# Patient Record
Sex: FEMALE | Race: WHITE | NOT HISPANIC OR LATINO | Employment: FULL TIME | URBAN - METROPOLITAN AREA
[De-identification: names, ages, dates, MRNs, and addresses within clinical notes are randomized per-mention and may not be internally consistent; named-entity substitution may affect disease eponyms.]

---

## 2017-06-14 ENCOUNTER — OFFICE VISIT (OUTPATIENT)
Dept: URGENT CARE | Facility: CLINIC | Age: 32
End: 2017-06-14
Payer: COMMERCIAL

## 2017-06-14 DIAGNOSIS — R23.8 OTHER SKIN CHANGES: ICD-10-CM

## 2017-06-14 PROCEDURE — 99214 OFFICE O/P EST MOD 30 MIN: CPT

## 2017-06-15 ENCOUNTER — APPOINTMENT (OUTPATIENT)
Dept: LAB | Facility: HOSPITAL | Age: 32
End: 2017-06-15
Attending: FAMILY MEDICINE
Payer: COMMERCIAL

## 2017-06-15 PROCEDURE — 87205 SMEAR GRAM STAIN: CPT

## 2017-06-15 PROCEDURE — 87147 CULTURE TYPE IMMUNOLOGIC: CPT

## 2017-06-15 PROCEDURE — 87070 CULTURE OTHR SPECIMN AEROBIC: CPT

## 2018-01-18 NOTE — MISCELLANEOUS
Message  Return to work or school:   Maggie Barry is under my professional care  She was seen in my office on 10/18/16   She is able to return to work on  In 2 to 3 days              Signatures   Electronically signed by : Queen Jian MD; Oct 18 2016  3:13PM EST                       (Author)

## 2018-02-28 ENCOUNTER — APPOINTMENT (OUTPATIENT)
Dept: RADIOLOGY | Facility: CLINIC | Age: 33
End: 2018-02-28
Payer: COMMERCIAL

## 2018-02-28 DIAGNOSIS — M25.561 RIGHT KNEE PAIN: ICD-10-CM

## 2018-02-28 PROCEDURE — 73564 X-RAY EXAM KNEE 4 OR MORE: CPT

## 2018-07-01 ENCOUNTER — HOSPITAL ENCOUNTER (EMERGENCY)
Facility: HOSPITAL | Age: 33
Discharge: HOME | End: 2018-07-01
Attending: EMERGENCY MEDICINE | Admitting: EMERGENCY MEDICINE
Payer: COMMERCIAL

## 2018-07-01 ENCOUNTER — APPOINTMENT (EMERGENCY)
Dept: RADIOLOGY | Facility: HOSPITAL | Age: 33
End: 2018-07-01
Payer: COMMERCIAL

## 2018-07-01 VITALS
SYSTOLIC BLOOD PRESSURE: 105 MMHG | RESPIRATION RATE: 15 BRPM | DIASTOLIC BLOOD PRESSURE: 64 MMHG | BODY MASS INDEX: 21.46 KG/M2 | HEART RATE: 94 BPM | OXYGEN SATURATION: 98 % | WEIGHT: 125 LBS | TEMPERATURE: 98.4 F

## 2018-07-01 DIAGNOSIS — S60.512A ABRASION OF PALM OF HAND, LEFT, INITIAL ENCOUNTER: Primary | ICD-10-CM

## 2018-07-01 PROCEDURE — 90715 TDAP VACCINE 7 YRS/> IM: CPT | Performed by: EMERGENCY MEDICINE

## 2018-07-01 PROCEDURE — 99283 EMERGENCY DEPT VISIT LOW MDM: CPT

## 2018-07-01 PROCEDURE — 73130 X-RAY EXAM OF HAND: CPT

## 2018-07-01 PROCEDURE — 90471 IMMUNIZATION ADMIN: CPT

## 2018-07-01 RX ORDER — GINSENG 100 MG
1 CAPSULE ORAL ONCE
Status: COMPLETED | OUTPATIENT
Start: 2018-07-01 | End: 2018-07-01

## 2018-07-01 RX ADMIN — TETANUS TOXOID, REDUCED DIPHTHERIA TOXOID AND ACELLULAR PERTUSSIS VACCINE, ADSORBED 0.5 ML: 5; 2.5; 8; 8; 2.5 SUSPENSION INTRAMUSCULAR at 22:49

## 2018-07-01 RX ADMIN — BACITRACIN ZINC 1 SMALL APPLICATION: 500 OINTMENT TOPICAL at 22:47

## 2018-07-02 NOTE — ED PROVIDER NOTES
History  Chief Complaint   Patient presents with    Hand Laceration     Left palm laceration; washing glass and it broke  Patient complains of left hand laceration occurred just prior to arrival washing a glass and it shattered - concerned with glass in laceration  Patient unsure of last tetanus vaccine  None       Past Medical History:   Diagnosis Date    Migraine        Past Surgical History:   Procedure Laterality Date    CHOLECYSTECTOMY         History reviewed  No pertinent family history  I have reviewed and agree with the history as documented  Social History   Substance Use Topics    Smoking status: Current Some Day Smoker     Types: Cigarettes    Smokeless tobacco: Never Used    Alcohol use Yes      Comment: socially        Review of Systems   All other systems reviewed and are negative  Physical Exam  Physical Exam   Constitutional: She appears well-developed and well-nourished  HENT:   Mouth/Throat: Oropharynx is clear and moist    Eyes: Conjunctivae and EOM are normal  Pupils are equal, round, and reactive to light  Neck: Normal range of motion  Neck supple  No spinous process tenderness present  Cardiovascular: Normal rate, regular rhythm, normal heart sounds and intact distal pulses  Pulmonary/Chest: Effort normal and breath sounds normal  No respiratory distress  She has no wheezes  Abdominal: Soft  Bowel sounds are normal  She exhibits no distension  There is no tenderness  Musculoskeletal: Normal range of motion  Left hand: She exhibits tenderness  She exhibits normal range of motion  Normal sensation noted  Normal strength noted  Hands:  Left palm 2 cm abrasion no foreign bodies present   Neurological: She is alert  She has normal strength  No sensory deficit  GCS eye subscore is 4  GCS verbal subscore is 5  GCS motor subscore is 6  Skin: Skin is warm and dry  No rash noted  Psychiatric: She has a normal mood and affect     Nursing note and vitals reviewed  Vital Signs  ED Triage Vitals [07/01/18 2110]   Temperature Pulse Respirations Blood Pressure SpO2   98 4 °F (36 9 °C) 94 15 105/64 98 %      Temp Source Heart Rate Source Patient Position - Orthostatic VS BP Location FiO2 (%)   Tympanic Monitor -- -- --      Pain Score       8           Vitals:    07/01/18 2110   BP: 105/64   Pulse: 94       Visual Acuity      ED Medications  Medications   tetanus-diphtheria-acellular pertussis (BOOSTRIX) IM injection 0 5 mL (0 5 mL Intramuscular Given 7/1/18 2249)   bacitracin topical ointment 1 small application (1 small application Topical Given 7/1/18 2247)       Diagnostic Studies  Results Reviewed     None                 XR hand 3+ views LEFT   ED Interpretation by Rocio Villanueva DO (07/01 2238)   No acute abnl                 Procedures  Procedures       Phone Contacts  ED Phone Contact    ED Course                               MDM  Number of Diagnoses or Management Options  Abrasion of palm of hand, left, initial encounter: new and requires workup     Amount and/or Complexity of Data Reviewed  Tests in the radiology section of CPT®: ordered and reviewed    Patient Progress  Patient progress: improved    CritCare Time    Disposition  Final diagnoses:   Abrasion of palm of hand, left, initial encounter     Time reflects when diagnosis was documented in both MDM as applicable and the Disposition within this note     Time User Action Codes Description Comment    7/1/2018 10:38 PM Thalia Cardenas Add [[id number] [id number]] Abrasion of palm of hand, left, initial encounter       ED Disposition     ED Disposition Condition Comment    Discharge  Priscella Moots discharge to home/self care  Condition at discharge: Good        Follow-up Information    None         There are no discharge medications for this patient  No discharge procedures on file      ED Provider  Electronically Signed by           Rocio Villanueva DO  07/02/18 8797

## 2018-07-02 NOTE — DISCHARGE INSTRUCTIONS
Abrasion   WHAT YOU NEED TO KNOW:   What is an abrasion? An abrasion is a scrape on your skin  It happens when your skin rubs against a rough surface  Some examples of an abrasion include rug burn, a skinned elbow, or road rash  Abrasions can be many shapes and sizes  The wound may hurt, bleed, bruise, or swell  How can I care for my abrasion? · Wash your hands and dry them with a clean towel  · Press a clean cloth against your wound to stop any bleeding  · Rinse your wound with a lot of clean water  Do not use harsh soap, alcohol, or iodine solutions  · Use a clean, wet cloth to remove any objects, such as small pieces of rocks or dirt  · Rub antibiotic ointment on your wound  This may help prevent infection and help your wound heal     · Cover the wound with a non-stick bandage  Change the bandage daily, and if gets wet or dirty  When should I seek immediate care? · The bleeding does not stop after 10 minutes of firm pressure  · You cannot rinse one or more foreign objects out of your wound  · You have red streaks on your skin coming from your wound  When should I contact my healthcare provider? · You have a fever or chills  · Your abrasion is red, warm, swollen, or draining pus  · You have questions or concerns about your condition or care  CARE AGREEMENT:   You have the right to help plan your care  Learn about your health condition and how it may be treated  Discuss treatment options with your caregivers to decide what care you want to receive  You always have the right to refuse treatment  The above information is an  only  It is not intended as medical advice for individual conditions or treatments  Talk to your doctor, nurse or pharmacist before following any medical regimen to see if it is safe and effective for you    © 2017 [employer] Information is for End User's use only and may not be sold, redistributed or otherwise used for commercial purposes  All illustrations and images included in CareNotes® are the copyrighted property of A D A M [employer]  or [employer]

## 2018-07-02 NOTE — ED NOTES
Pt is pleasant  Pt's wound is cleaned her with a chlorhexidine patch and two saline flushes        Valery Bolivar  07/01/18 5182

## 2018-07-02 NOTE — ED NOTES
Applied bacitracin to L  Hand laceration with SN and placed bandaid over site  Site clean       Javy Burr RN  07/01/18 3030

## 2019-04-20 ENCOUNTER — OFFICE VISIT (OUTPATIENT)
Dept: URGENT CARE | Facility: CLINIC | Age: 34
End: 2019-04-20
Payer: COMMERCIAL

## 2019-04-20 VITALS
DIASTOLIC BLOOD PRESSURE: 77 MMHG | RESPIRATION RATE: 16 BRPM | WEIGHT: 142 LBS | SYSTOLIC BLOOD PRESSURE: 117 MMHG | HEART RATE: 94 BPM | HEIGHT: 64 IN | OXYGEN SATURATION: 99 % | BODY MASS INDEX: 24.24 KG/M2 | TEMPERATURE: 97.9 F

## 2019-04-20 DIAGNOSIS — J01.10 ACUTE FRONTAL SINUSITIS, RECURRENCE NOT SPECIFIED: ICD-10-CM

## 2019-04-20 DIAGNOSIS — H10.9 CONJUNCTIVITIS OF LEFT EYE, UNSPECIFIED CONJUNCTIVITIS TYPE: Primary | ICD-10-CM

## 2019-04-20 PROCEDURE — S9083 URGENT CARE CENTER GLOBAL: HCPCS | Performed by: FAMILY MEDICINE

## 2019-04-20 PROCEDURE — G0382 LEV 3 HOSP TYPE B ED VISIT: HCPCS | Performed by: FAMILY MEDICINE

## 2019-04-20 RX ORDER — TOBRAMYCIN 3 MG/ML
1 SOLUTION/ DROPS OPHTHALMIC
Qty: 5 ML | Refills: 0 | Status: SHIPPED | OUTPATIENT
Start: 2019-04-20 | End: 2020-02-03 | Stop reason: ALTCHOICE

## 2019-04-20 RX ORDER — MOMETASONE FUROATE 50 UG/1
1 SPRAY, METERED NASAL 2 TIMES DAILY
Qty: 17 G | Refills: 0 | Status: SHIPPED | OUTPATIENT
Start: 2019-04-20 | End: 2020-02-03 | Stop reason: ALTCHOICE

## 2019-04-20 RX ORDER — CEFUROXIME AXETIL 250 MG/1
250 TABLET ORAL EVERY 12 HOURS SCHEDULED
Qty: 20 TABLET | Refills: 0 | Status: SHIPPED | OUTPATIENT
Start: 2019-04-20 | End: 2019-04-30

## 2019-08-19 ENCOUNTER — OFFICE VISIT (OUTPATIENT)
Dept: URGENT CARE | Facility: CLINIC | Age: 34
End: 2019-08-19
Payer: COMMERCIAL

## 2019-08-19 VITALS
SYSTOLIC BLOOD PRESSURE: 123 MMHG | HEIGHT: 64 IN | BODY MASS INDEX: 23.15 KG/M2 | OXYGEN SATURATION: 100 % | TEMPERATURE: 99.6 F | DIASTOLIC BLOOD PRESSURE: 83 MMHG | WEIGHT: 135.6 LBS | RESPIRATION RATE: 20 BRPM | HEART RATE: 82 BPM

## 2019-08-19 DIAGNOSIS — H00.014 HORDEOLUM OF LEFT UPPER EYELID, UNSPECIFIED HORDEOLUM TYPE: Primary | ICD-10-CM

## 2019-08-19 PROCEDURE — S9083 URGENT CARE CENTER GLOBAL: HCPCS | Performed by: EMERGENCY MEDICINE

## 2019-08-19 PROCEDURE — G0382 LEV 3 HOSP TYPE B ED VISIT: HCPCS | Performed by: EMERGENCY MEDICINE

## 2019-08-19 RX ORDER — TOBRAMYCIN 3 MG/ML
1 SOLUTION/ DROPS OPHTHALMIC
Qty: 5 ML | Refills: 0 | Status: SHIPPED | OUTPATIENT
Start: 2019-08-19 | End: 2019-08-26

## 2019-08-19 RX ORDER — MEDROXYPROGESTERONE ACETATE 150 MG/ML
INJECTION, SUSPENSION INTRAMUSCULAR
COMMUNITY
End: 2020-02-03 | Stop reason: ALTCHOICE

## 2019-08-19 NOTE — PROGRESS NOTES
Assessment/Plan:    No problem-specific Assessment & Plan notes found for this encounter  Diagnoses and all orders for this visit:    Hordeolum of left upper eyelid, unspecified hordeolum type  -     tobramycin (TOBREX) 0 3 % SOLN; Administer 1 drop into the left eye every 4 (four) hours while awake for 7 days    Other orders  -     medroxyPROGESTERone (DEPO-PROVERA) 150 mg/mL injection; Inject into a muscle          Subjective:      Patient ID: Sherry Lopez is a 36 y o  female  Stye of L eye, upper outer lid x 3 days    Eye Pain    The left eye is affected  This is a new problem  The current episode started in the past 7 days  The problem occurs constantly  The problem has been gradually worsening  There was no injury mechanism  The pain is at a severity of 3/10  The pain is mild  There is no known exposure to pink eye  She does not wear contacts  She has tried nothing for the symptoms  The treatment provided no relief  The following portions of the patient's history were reviewed and updated as appropriate: allergies, current medications, past family history, past medical history, past social history, past surgical history and problem list     Review of Systems   Eyes: Positive for pain  All other systems reviewed and are negative  Objective:      /83   Pulse 82   Temp 99 6 °F (37 6 °C)   Resp 20   Ht 5' 4" (1 626 m)   Wt 61 5 kg (135 lb 9 6 oz)   SpO2 100%   BMI 23 28 kg/m²          Physical Exam   Constitutional: She is oriented to person, place, and time  She appears well-developed and well-nourished  HENT:   Nose: Nose normal    Eyes: Pupils are equal, round, and reactive to light  Conjunctivae and EOM are normal  Left eye exhibits hordeolum  Neck: Normal range of motion  Cardiovascular: Normal rate  Pulmonary/Chest: Effort normal    Abdominal: Soft  Neurological: She is alert and oriented to person, place, and time  Skin: Skin is warm and dry  Psychiatric: She has a normal mood and affect  Her behavior is normal  Judgment and thought content normal    Nursing note and vitals reviewed

## 2020-02-03 ENCOUNTER — OFFICE VISIT (OUTPATIENT)
Dept: FAMILY MEDICINE CLINIC | Facility: CLINIC | Age: 35
End: 2020-02-03
Payer: COMMERCIAL

## 2020-02-03 VITALS
HEART RATE: 78 BPM | BODY MASS INDEX: 25.78 KG/M2 | SYSTOLIC BLOOD PRESSURE: 124 MMHG | HEIGHT: 64 IN | WEIGHT: 151 LBS | TEMPERATURE: 99.7 F | DIASTOLIC BLOOD PRESSURE: 80 MMHG

## 2020-02-03 DIAGNOSIS — Z13.1 SCREENING FOR DIABETES MELLITUS: ICD-10-CM

## 2020-02-03 DIAGNOSIS — F41.0 PANIC ATTACKS: ICD-10-CM

## 2020-02-03 DIAGNOSIS — F41.9 ANXIETY: Primary | ICD-10-CM

## 2020-02-03 DIAGNOSIS — G43.909 MIGRAINE WITHOUT STATUS MIGRAINOSUS, NOT INTRACTABLE, UNSPECIFIED MIGRAINE TYPE: ICD-10-CM

## 2020-02-03 DIAGNOSIS — Z13.6 SCREENING FOR CARDIOVASCULAR CONDITION: ICD-10-CM

## 2020-02-03 PROCEDURE — 1036F TOBACCO NON-USER: CPT | Performed by: NURSE PRACTITIONER

## 2020-02-03 PROCEDURE — 3008F BODY MASS INDEX DOCD: CPT | Performed by: NURSE PRACTITIONER

## 2020-02-03 PROCEDURE — 99203 OFFICE O/P NEW LOW 30 MIN: CPT | Performed by: NURSE PRACTITIONER

## 2020-02-03 RX ORDER — SERTRALINE HYDROCHLORIDE 25 MG/1
25 TABLET, FILM COATED ORAL DAILY
Qty: 30 TABLET | Refills: 0 | Status: SHIPPED | OUTPATIENT
Start: 2020-02-03

## 2020-02-03 RX ORDER — CLONAZEPAM 0.5 MG/1
0.5 TABLET ORAL 2 TIMES DAILY PRN
Qty: 30 TABLET | Refills: 0 | Status: SHIPPED | OUTPATIENT
Start: 2020-02-03

## 2020-02-03 NOTE — PROGRESS NOTES
Assessment/Plan:         Diagnoses and all orders for this visit:    Anxiety  -     Ambulatory referral to [facility]; Future  -     sertraline (ZOLOFT) 25 mg tablet; Take 1 tablet (25 mg total) by mouth daily  -     clonazePAM (KlonoPIN) 0 5 mg tablet; Take 1 tablet (0 5 mg total) by mouth 2 (two) times a day as needed for anxiety    Migraine without status migrainosus, not intractable, unspecified migraine type  -     Ambulatory referral to Neurology; Future    Panic attacks  -     TSH, 3rd generation; Future  -     T4, free; Future  -     TSH, 3rd generation  -     T4, free    Screening for cardiovascular condition  -     CBC and differential; Future  -     Comprehensive metabolic panel; Future  -     Lipid panel; Future  -     CBC and differential  -     Comprehensive metabolic panel  -     Lipid panel    Screening for diabetes mellitus  -     UA (URINE) with reflex to Scope; Future    Other orders  -     Cancel: Human Immunodeficiency Virus 1/2 Antigen / Antibody ( Fourth Generation) with Reflex Testing; Future          Subjective:      Patient ID: Fransisco Machado is a 36 y o  female  Here to establish care with this practice, reports last Visit was with [facility] last February, history of migraine and anxiety  Migraine - used percocet and then had botox injections which were the "best for migraine"    Anxiety- lexapro and klonopin for panic   Anxiety   Presents for initial visit  Onset was 1 to 6 months ago  The problem has been unchanged  Symptoms include decreased concentration, depressed mood, excessive worry, insomnia, irritability, nervous/anxious behavior, palpitations and panic  Patient reports no chest pain, compulsions, feeling of choking, shortness of breath or suicidal ideas  Primary symptoms comment: sudden burst of crying  Symptoms occur most days  The severity of symptoms is moderate   The symptoms are aggravated by family issues (spouse was meth addicted, and comminted suicide in front of [facility] 2 years ago)  The quality of sleep is fair  Nighttime awakenings: occasional      There are no known risk factors  Her past medical history is significant for anxiety/panic attacks  Past treatments include benzodiazephines and SSRIs  The treatment provided moderate relief  Compliance with prior treatments has been good  Past compliance problems: stopped all meds in March of last year "thought I could do this on my own"       The following portions of the patient's history were reviewed and updated as appropriate: allergies, current medications, past family history, past medical history, past surgical history and problem list     Review of Systems   Constitutional: Positive for irritability  Negative for fatigue  HENT: Negative  Negative for congestion  Eyes: Negative  Respiratory: Negative  Negative for shortness of breath  Cardiovascular: Positive for palpitations  Negative for chest pain  Gastrointestinal: Negative  Endocrine: Negative  Genitourinary: Negative  Musculoskeletal: Negative  Skin: Negative  Allergic/Immunologic: Negative  Neurological: Negative  Hematological: Negative  Psychiatric/Behavioral: Positive for decreased concentration  Negative for suicidal ideas  The patient is nervous/anxious and has insomnia  Objective:      /80 (BP Location: Left arm, Patient Position: Sitting, Cuff Size: Standard)   Pulse 78   Temp 99 7 °F (37 6 °C) (Tympanic)   Ht 5' 4" (1 626 m)   Wt 68 5 kg (151 lb)   BMI 25 92 kg/m²          Physical Exam   Constitutional: She is oriented to person, place, and time  She appears well-developed and well-nourished  No distress  HENT:   Head: Normocephalic and atraumatic  Right Ear: External ear normal    Left Ear: External ear normal    Nose: Nose normal    Mouth/Throat: Oropharynx is clear and moist  No oropharyngeal exudate  Eyes: Pupils are equal, round, and reactive to light  Conjunctivae and EOM are normal  Right eye exhibits no discharge  Left eye exhibits no discharge  Neck: Normal range of motion  Neck supple  No thyromegaly present  Cardiovascular: Normal rate, regular rhythm, normal heart sounds and intact distal pulses  No murmur heard  Pulmonary/Chest: Effort normal and breath sounds normal  No respiratory distress  She has no wheezes  Abdominal: Soft  Bowel sounds are normal    Musculoskeletal: Normal range of motion  She exhibits no edema or tenderness  Lymphadenopathy:     She has no cervical adenopathy  Neurological: She is alert and oriented to person, place, and time  Skin: Skin is warm and dry  Capillary refill takes less than 2 seconds  She is not diaphoretic  Psychiatric: Her speech is normal and behavior is normal  Judgment and thought content normal  Her mood appears anxious  Cognition and memory are normal  She does not exhibit a depressed mood  Nursing note and vitals reviewed        Allergies   Allergen Reactions    Bee Venom      Other reaction(s): anaphylaxis

## 2020-02-03 NOTE — PATIENT INSTRUCTIONS
Anxiety   WHAT YOU NEED TO KNOW:   Anxiety is a condition that causes you to feel extremely worried or nervous  The feelings are so strong that they can cause problems with your daily activities or sleep  Anxiety may be triggered by something you fear, or it may happen without a cause  Family or work stress, smoking, caffeine, and alcohol can increase your risk for anxiety  Certain medicines or health conditions can also increase your risk  Anxiety can become a long-term condition if it is not managed or treated  DISCHARGE INSTRUCTIONS:   Call 911 if:   · You have chest pain, tightness, or heaviness that may spread to your shoulders, arms, jaw, neck, or back  · You feel like hurting yourself or someone else  Contact your healthcare provider if:   · Your symptoms get worse or do not get better with treatment  · Your anxiety keeps you from doing your regular daily activities  · You have new symptoms since your last visit  · You have questions or concerns about your condition or care  Medicines:   · Medicines  may be given to help you feel more calm and relaxed, and decrease your symptoms  · Take your medicine as directed  Contact your healthcare provider if you think your medicine is not helping or if you have side effects  Tell him of her if you are allergic to any medicine  Keep a list of the medicines, vitamins, and herbs you take  Include the amounts, and when and why you take them  Bring the list or the pill bottles to follow-up visits  Carry your medicine list with you in case of an emergency  Follow up with your healthcare provider within 2 weeks or as directed:  Write down your questions so you remember to ask them during your visits  Manage anxiety:   · Talk to someone about your anxiety  Your healthcare provider may suggest counseling  Cognitive behavioral therapy can help you understand and change how you react to events that trigger your symptoms   You might feel more comfortable talking with a friend or family member about your anxiety  Choose someone you know will be supportive and encouraging  · Find ways to relax  Activities such as exercise, meditation, or listening to music can help you relax  Spend time with friends, or do things you enjoy  · Practice deep breathing  Deep breathing can help you relax when you feel anxious  Focus on taking slow, deep breaths several times a day, or during an anxiety attack  Breathe in through your nose and out through your mouth  · Create a regular sleep routine  Regular sleep can help you feel calmer during the day  Go to sleep and wake up at the same times every day  Do not watch television or use the computer right before bed  Your room should be comfortable, dark, and quiet  · Eat a variety of healthy foods  Healthy foods include fruits, vegetables, low-fat dairy products, lean meats, fish, whole-grain breads, and cooked beans  Healthy foods can help you feel less anxious and have more energy  · Exercise regularly  Exercise can increase your energy level  Exercise may also lift your mood and help you sleep better  Your healthcare provider can help you create an exercise plan  · Do not smoke  Nicotine and other chemicals in cigarettes and cigars can increase anxiety  Ask your healthcare provider for information if you currently smoke and need help to quit  E-cigarettes or smokeless tobacco still contain nicotine  Talk to your healthcare provider before you use these products  · Do not have caffeine  Caffeine can make your symptoms worse  Do not have foods or drinks that are meant to increase your energy level  · Limit or do not drink alcohol  Ask your healthcare provider if alcohol is safe for you  You may not be able to drink alcohol if you take certain anxiety or depression medicines  Limit alcohol to 1 drink per day if you are a woman  Limit alcohol to 2 drinks per day if you are a man   A drink of alcohol is 12 ounces of beer, 5 ounces of wine, or 1½ ounces of liquor  · Do not use drugs  Drugs can make your anxiety worse  It can also make anxiety hard to manage  Talk to your healthcare provider if you use drugs and want help to quit  © 2017 [employer] Information is for End User's use only and may not be sold, redistributed or otherwise used for commercial purposes  All illustrations and images included in CareNotes® are the copyrighted property of [employer] M , [employer]  or Jar[employer] Murrieta  The above information is an  only  It is not intended as medical advice for individual conditions or treatments  Talk to your doctor, nurse or pharmacist before following any medical regimen to see if it is safe and effective for you

## 2020-04-23 ENCOUNTER — TELEPHONE (OUTPATIENT)
Dept: BEHAVIORAL/MENTAL HEALTH CLINIC | Facility: CLINIC | Age: 35
End: 2020-04-23

## 2020-06-26 ENCOUNTER — TELEPHONE (OUTPATIENT)
Dept: BEHAVIORAL/MENTAL HEALTH CLINIC | Facility: CLINIC | Age: 35
End: 2020-06-26

## 2020-06-29 ENCOUNTER — TELEPHONE (OUTPATIENT)
Dept: BEHAVIORAL/MENTAL HEALTH CLINIC | Facility: CLINIC | Age: 35
End: 2020-06-29

## 2023-06-22 ENCOUNTER — HOSPITAL ENCOUNTER (EMERGENCY)
Facility: HOSPITAL | Age: 38
Discharge: HOME | End: 2023-06-22
Attending: EMERGENCY MEDICINE
Payer: COMMERCIAL

## 2023-06-22 ENCOUNTER — APPOINTMENT (EMERGENCY)
Dept: CT IMAGING | Facility: HOSPITAL | Age: 38
End: 2023-06-22
Payer: COMMERCIAL

## 2023-06-22 VITALS
WEIGHT: 154 LBS | DIASTOLIC BLOOD PRESSURE: 73 MMHG | RESPIRATION RATE: 18 BRPM | SYSTOLIC BLOOD PRESSURE: 115 MMHG | TEMPERATURE: 98.4 F | OXYGEN SATURATION: 99 % | HEART RATE: 66 BPM | BODY MASS INDEX: 26.43 KG/M2

## 2023-06-22 DIAGNOSIS — E83.51 HYPOCALCEMIA: ICD-10-CM

## 2023-06-22 DIAGNOSIS — R10.9 ABDOMINAL PAIN: Primary | ICD-10-CM

## 2023-06-22 DIAGNOSIS — R16.0 HEPATOMEGALY: ICD-10-CM

## 2023-06-22 DIAGNOSIS — E87.6 HYPOKALEMIA: ICD-10-CM

## 2023-06-22 DIAGNOSIS — R74.01 TRANSAMINITIS: ICD-10-CM

## 2023-06-22 LAB
ALBUMIN SERPL BCP-MCNC: 3.1 G/DL (ref 3.5–5)
ALP SERPL-CCNC: 67 U/L (ref 34–104)
ALT SERPL W P-5'-P-CCNC: 180 U/L (ref 7–52)
ANION GAP SERPL CALCULATED.3IONS-SCNC: 5 MMOL/L
AST SERPL W P-5'-P-CCNC: 88 U/L (ref 13–39)
BACTERIA UR QL AUTO: NORMAL /HPF
BASOPHILS # BLD AUTO: 0.04 THOUSANDS/ÂΜL (ref 0–0.1)
BASOPHILS NFR BLD AUTO: 1 % (ref 0–1)
BILIRUB SERPL-MCNC: 0.34 MG/DL (ref 0.2–1)
BILIRUB UR QL STRIP: NEGATIVE
BUN SERPL-MCNC: 8 MG/DL (ref 5–25)
CALCIUM ALBUM COR SERPL-MCNC: 7.1 MG/DL (ref 8.3–10.1)
CALCIUM SERPL-MCNC: 6.4 MG/DL (ref 8.4–10.2)
CARDIAC TROPONIN I PNL SERPL HS: <2 NG/L
CHLORIDE SERPL-SCNC: 118 MMOL/L (ref 96–108)
CLARITY UR: CLEAR
CO2 SERPL-SCNC: 18 MMOL/L (ref 21–32)
COLOR UR: ABNORMAL
CREAT SERPL-MCNC: 0.5 MG/DL (ref 0.6–1.3)
EOSINOPHIL # BLD AUTO: 0.11 THOUSAND/ÂΜL (ref 0–0.61)
EOSINOPHIL NFR BLD AUTO: 2 % (ref 0–6)
ERYTHROCYTE [DISTWIDTH] IN BLOOD BY AUTOMATED COUNT: 12.4 % (ref 11.6–15.1)
EXT PREGNANCY TEST URINE: NEGATIVE
EXT. CONTROL: NORMAL
GFR SERPL CREATININE-BSD FRML MDRD: 124 ML/MIN/1.73SQ M
GLUCOSE SERPL-MCNC: 78 MG/DL (ref 65–140)
GLUCOSE UR STRIP-MCNC: NEGATIVE MG/DL
HCT VFR BLD AUTO: 42.9 % (ref 34.8–46.1)
HGB BLD-MCNC: 14 G/DL (ref 11.5–15.4)
HGB UR QL STRIP.AUTO: NEGATIVE
IMM GRANULOCYTES # BLD AUTO: 0.01 THOUSAND/UL (ref 0–0.2)
IMM GRANULOCYTES NFR BLD AUTO: 0 % (ref 0–2)
KETONES UR STRIP-MCNC: ABNORMAL MG/DL
LEUKOCYTE ESTERASE UR QL STRIP: NEGATIVE
LIPASE SERPL-CCNC: 25 U/L (ref 11–82)
LYMPHOCYTES # BLD AUTO: 2.03 THOUSANDS/ÂΜL (ref 0.6–4.47)
LYMPHOCYTES NFR BLD AUTO: 31 % (ref 14–44)
MCH RBC QN AUTO: 29.7 PG (ref 26.8–34.3)
MCHC RBC AUTO-ENTMCNC: 32.6 G/DL (ref 31.4–37.4)
MCV RBC AUTO: 91 FL (ref 82–98)
MONOCYTES # BLD AUTO: 0.6 THOUSAND/ÂΜL (ref 0.17–1.22)
MONOCYTES NFR BLD AUTO: 9 % (ref 4–12)
NEUTROPHILS # BLD AUTO: 3.76 THOUSANDS/ÂΜL (ref 1.85–7.62)
NEUTS SEG NFR BLD AUTO: 57 % (ref 43–75)
NITRITE UR QL STRIP: NEGATIVE
NON-SQ EPI CELLS URNS QL MICRO: NORMAL /HPF
NRBC BLD AUTO-RTO: 0 /100 WBCS
PH UR STRIP.AUTO: 5.5 [PH]
PLATELET # BLD AUTO: 236 THOUSANDS/UL (ref 149–390)
PMV BLD AUTO: 10.5 FL (ref 8.9–12.7)
POTASSIUM SERPL-SCNC: 2.7 MMOL/L (ref 3.5–5.3)
PROT SERPL-MCNC: 5 G/DL (ref 6.4–8.4)
PROT UR STRIP-MCNC: ABNORMAL MG/DL
RBC # BLD AUTO: 4.72 MILLION/UL (ref 3.81–5.12)
RBC #/AREA URNS AUTO: NORMAL /HPF
SODIUM SERPL-SCNC: 141 MMOL/L (ref 135–147)
SP GR UR STRIP.AUTO: 1.01 (ref 1–1.03)
UROBILINOGEN UR STRIP-ACNC: <2 MG/DL
WBC # BLD AUTO: 6.55 THOUSAND/UL (ref 4.31–10.16)
WBC #/AREA URNS AUTO: NORMAL /HPF

## 2023-06-22 PROCEDURE — 81001 URINALYSIS AUTO W/SCOPE: CPT | Performed by: PHYSICIAN ASSISTANT

## 2023-06-22 PROCEDURE — 93005 ELECTROCARDIOGRAM TRACING: CPT

## 2023-06-22 PROCEDURE — 96366 THER/PROPH/DIAG IV INF ADDON: CPT

## 2023-06-22 PROCEDURE — 85025 COMPLETE CBC W/AUTO DIFF WBC: CPT | Performed by: PHYSICIAN ASSISTANT

## 2023-06-22 PROCEDURE — 83690 ASSAY OF LIPASE: CPT | Performed by: PHYSICIAN ASSISTANT

## 2023-06-22 PROCEDURE — 80053 COMPREHEN METABOLIC PANEL: CPT | Performed by: PHYSICIAN ASSISTANT

## 2023-06-22 PROCEDURE — 96368 THER/DIAG CONCURRENT INF: CPT

## 2023-06-22 PROCEDURE — 96375 TX/PRO/DX INJ NEW DRUG ADDON: CPT

## 2023-06-22 PROCEDURE — 96361 HYDRATE IV INFUSION ADD-ON: CPT

## 2023-06-22 PROCEDURE — 99284 EMERGENCY DEPT VISIT MOD MDM: CPT

## 2023-06-22 PROCEDURE — G1004 CDSM NDSC: HCPCS

## 2023-06-22 PROCEDURE — 36415 COLL VENOUS BLD VENIPUNCTURE: CPT | Performed by: PHYSICIAN ASSISTANT

## 2023-06-22 PROCEDURE — 74177 CT ABD & PELVIS W/CONTRAST: CPT

## 2023-06-22 PROCEDURE — 84484 ASSAY OF TROPONIN QUANT: CPT | Performed by: PHYSICIAN ASSISTANT

## 2023-06-22 PROCEDURE — 86618 LYME DISEASE ANTIBODY: CPT | Performed by: PHYSICIAN ASSISTANT

## 2023-06-22 PROCEDURE — 96365 THER/PROPH/DIAG IV INF INIT: CPT

## 2023-06-22 PROCEDURE — 81025 URINE PREGNANCY TEST: CPT | Performed by: PHYSICIAN ASSISTANT

## 2023-06-22 RX ORDER — POTASSIUM CHLORIDE 20 MEQ/1
40 TABLET, EXTENDED RELEASE ORAL ONCE
Status: COMPLETED | OUTPATIENT
Start: 2023-06-22 | End: 2023-06-22

## 2023-06-22 RX ORDER — POTASSIUM CHLORIDE 14.9 MG/ML
20 INJECTION INTRAVENOUS ONCE
Status: COMPLETED | OUTPATIENT
Start: 2023-06-22 | End: 2023-06-22

## 2023-06-22 RX ORDER — MORPHINE SULFATE 4 MG/ML
4 INJECTION, SOLUTION INTRAMUSCULAR; INTRAVENOUS ONCE
Status: COMPLETED | OUTPATIENT
Start: 2023-06-22 | End: 2023-06-22

## 2023-06-22 RX ORDER — MAGNESIUM SULFATE 1 G/100ML
1 INJECTION INTRAVENOUS ONCE
Status: COMPLETED | OUTPATIENT
Start: 2023-06-22 | End: 2023-06-22

## 2023-06-22 RX ORDER — MEDROXYPROGESTERONE ACETATE 150 MG/ML
150 INJECTION, SUSPENSION INTRAMUSCULAR
COMMUNITY

## 2023-06-22 RX ORDER — KETOROLAC TROMETHAMINE 30 MG/ML
15 INJECTION, SOLUTION INTRAMUSCULAR; INTRAVENOUS ONCE
Status: COMPLETED | OUTPATIENT
Start: 2023-06-22 | End: 2023-06-22

## 2023-06-22 RX ORDER — ONDANSETRON 2 MG/ML
4 INJECTION INTRAMUSCULAR; INTRAVENOUS ONCE
Status: COMPLETED | OUTPATIENT
Start: 2023-06-22 | End: 2023-06-22

## 2023-06-22 RX ADMIN — SODIUM CHLORIDE 500 ML: 0.9 INJECTION, SOLUTION INTRAVENOUS at 22:18

## 2023-06-22 RX ADMIN — MAGNESIUM SULFATE IN DEXTROSE 1 G: 10 INJECTION, SOLUTION INTRAVENOUS at 22:18

## 2023-06-22 RX ADMIN — POTASSIUM CHLORIDE 40 MEQ: 1500 TABLET, EXTENDED RELEASE ORAL at 21:14

## 2023-06-22 RX ADMIN — POTASSIUM CHLORIDE 20 MEQ: 14.9 INJECTION, SOLUTION INTRAVENOUS at 21:14

## 2023-06-22 RX ADMIN — MORPHINE SULFATE 4 MG: 4 INJECTION, SOLUTION INTRAMUSCULAR; INTRAVENOUS at 23:28

## 2023-06-22 RX ADMIN — KETOROLAC TROMETHAMINE 15 MG: 30 INJECTION, SOLUTION INTRAMUSCULAR; INTRAVENOUS at 20:07

## 2023-06-22 RX ADMIN — ONDANSETRON 4 MG: 2 INJECTION INTRAMUSCULAR; INTRAVENOUS at 20:07

## 2023-06-22 RX ADMIN — SODIUM CHLORIDE 1000 ML: 0.9 INJECTION, SOLUTION INTRAVENOUS at 20:07

## 2023-06-22 RX ADMIN — IOHEXOL 100 ML: 350 INJECTION, SOLUTION INTRAVENOUS at 21:07

## 2023-06-22 NOTE — ED PROVIDER NOTES
History  Chief Complaint   Patient presents with   • Flank Pain     Right flank pain  Intermittent since last week, the past few days has been constant and worsening  +N -VD     Patient is a 40 y/o F with h/o migraines, cholecystectomy that presents to the ED with epigastric pain that radiates to back that started 1 week ago, but worsened  She has nausea and vomited once this week  She has chronic diarrhea  No blood in stool  No fevers, but has chills  No urinary symptoms  Patient on Depo, so unsure when her LNMP was, but denies risk of pregnancy  Eating makes the pain worse, nothing makes it better  She did not take anything for pain  History provided by:  Patient  Flank Pain  Pain location:  Epigastric  Pain quality: aching    Pain radiates to:  Back  Pain severity:  Moderate  Onset quality:  Gradual  Duration:  1 week  Timing:  Constant  Progression:  Worsening  Chronicity:  New  Context: not sick contacts, not suspicious food intake and not trauma    Relieved by:  Nothing  Worsened by:  Eating  Ineffective treatments:  None tried  Associated symptoms: chills, diarrhea, nausea and vomiting    Associated symptoms: no chest pain, no constipation, no dysuria, no fatigue, no fever, no shortness of breath, no sore throat and no vaginal bleeding    Risk factors: not pregnant and no recent hospitalization        Prior to Admission Medications   Prescriptions Last Dose Informant Patient Reported? Taking?    clonazePAM (KlonoPIN) 0 5 mg tablet   No No   Sig: Take 1 tablet (0 5 mg total) by mouth 2 (two) times a day as needed for anxiety   medroxyPROGESTERone (DEPO-PROVERA) 150 mg/mL injection  Self Yes Yes   Sig: Inject 150 mg into a muscle every 3 (three) months      Facility-Administered Medications: None       Past Medical History:   Diagnosis Date   • Migraine        Past Surgical History:   Procedure Laterality Date   • CHOLECYSTECTOMY         Family History   Problem Relation Age of Onset   • No Known Problems Mother    • No Known Problems Father      I have reviewed and agree with the history as documented  E-Cigarette/Vaping   • E-Cigarette Use Former User      E-Cigarette/Vaping Substances     Social History     Tobacco Use   • Smoking status: Former     Types: Cigarettes   • Smokeless tobacco: Never   Vaping Use   • Vaping Use: Former   Substance Use Topics   • Alcohol use: Yes     Comment: socially   • Drug use: No       Review of Systems   Constitutional: Positive for chills  Negative for fatigue and fever  HENT: Negative for congestion and sore throat  Respiratory: Negative for shortness of breath  Cardiovascular: Negative for chest pain  Gastrointestinal: Positive for abdominal pain, diarrhea, nausea and vomiting  Negative for constipation  Genitourinary: Positive for flank pain  Negative for dysuria and vaginal bleeding  Musculoskeletal: Positive for back pain  Negative for neck pain  Skin: Negative for color change, pallor and rash  Neurological: Negative for dizziness, weakness and numbness  Psychiatric/Behavioral: Negative for confusion  All other systems reviewed and are negative  Physical Exam  Physical Exam  Vitals and nursing note reviewed  Constitutional:       General: She is not in acute distress  Appearance: Normal appearance  She is well-developed, well-groomed and normal weight  She is not ill-appearing or diaphoretic  HENT:      Head: Normocephalic and atraumatic  Right Ear: Hearing normal       Left Ear: Hearing normal       Nose: Nose normal    Eyes:      Conjunctiva/sclera: Conjunctivae normal    Cardiovascular:      Rate and Rhythm: Normal rate and regular rhythm  Heart sounds: Normal heart sounds  Pulmonary:      Effort: Pulmonary effort is normal       Breath sounds: Normal breath sounds  No wheezing, rhonchi or rales  Abdominal:      General: Abdomen is flat  Bowel sounds are normal       Palpations: Abdomen is soft  Tenderness: There is abdominal tenderness in the right upper quadrant and epigastric area  There is no right CVA tenderness, left CVA tenderness, guarding or rebound  Musculoskeletal:         General: Normal range of motion  Cervical back: Normal range of motion and neck supple  Right lower leg: No edema  Left lower leg: No edema  Skin:     General: Skin is warm and dry  Coloration: Skin is not jaundiced or pale  Findings: No rash  Neurological:      General: No focal deficit present  Mental Status: She is alert and oriented to person, place, and time  Motor: No weakness  Psychiatric:         Mood and Affect: Mood normal          Behavior: Behavior is cooperative           Vital Signs  ED Triage Vitals [06/22/23 1835]   Temperature Pulse Respirations Blood Pressure SpO2   98 4 °F (36 9 °C) 76 16 126/97 100 %      Temp Source Heart Rate Source Patient Position - Orthostatic VS BP Location FiO2 (%)   Temporal Monitor Sitting Right arm --      Pain Score       7           Vitals:    06/22/23 1920 06/22/23 2115 06/22/23 2130 06/22/23 2334   BP: 125/79 129/71 125/70 115/73   Pulse: 69 67 66    Patient Position - Orthostatic VS:  Lying Lying Sitting         Visual Acuity      ED Medications  Medications   sodium chloride 0 9 % bolus 1,000 mL (0 mL Intravenous Stopped 6/22/23 2114)   ketorolac (TORADOL) injection 15 mg (15 mg Intravenous Given 6/22/23 2007)   ondansetron (ZOFRAN) injection 4 mg (4 mg Intravenous Given 6/22/23 2007)   potassium chloride (K-DUR,KLOR-CON) CR tablet 40 mEq (40 mEq Oral Given 6/22/23 2114)   potassium chloride 20 mEq IVPB (premix) (0 mEq Intravenous Stopped 6/22/23 2333)   iohexol (OMNIPAQUE) 350 MG/ML injection (SINGLE-DOSE) 100 mL (100 mL Intravenous Given 6/22/23 2107)   sodium chloride 0 9 % bolus 500 mL (0 mL Intravenous Stopped 6/22/23 2321)   magnesium sulfate IVPB (premix) SOLN 1 g (0 g Intravenous Stopped 6/22/23 2321)   morphine injection 4 mg (4 mg Intravenous Given 6/22/23 2328)       Diagnostic Studies  Results Reviewed     Procedure Component Value Units Date/Time    Urine Microscopic [[id number]]  (Normal) Collected: 06/22/23 2247    Lab Status: Final result Specimen: Urine, Clean Catch Updated: 06/22/23 2311     RBC, UA None Seen /hpf      WBC, UA 1-2 /hpf      Epithelial Cells Occasional /hpf      Bacteria, UA None Seen /hpf     UA w Reflex to Microscopic w Reflex to Culture [[id number]]  (Abnormal) Collected: 06/22/23 2247    Lab Status: Final result Specimen: Urine, Clean Catch Updated: 06/22/23 2301     Color, UA Light Yellow     Clarity, UA Clear     Specific Gravity, UA 1 010     pH, UA 5 5     Leukocytes, UA Negative     Nitrite, UA Negative     Protein, UA Trace mg/dl      Glucose, UA Negative mg/dl      Ketones, UA 10 (1+) mg/dl      Urobilinogen, UA <2 0 mg/dl      Bilirubin, UA Negative     Occult Blood, UA Negative    Lyme Antibody Profile with reflex to [facility] [[id number]] Collected: 06/22/23 2124    Lab Status:  In process Specimen: Blood from Arm, Right Updated: 06/22/23 2130    Comprehensive metabolic panel [[id number]]  (Abnormal) Collected: 06/22/23 1959    Lab Status: Final result Specimen: Blood from Arm, Right Updated: 06/22/23 2047     Sodium 141 mmol/L      Potassium 2 7 mmol/L      Chloride 118 mmol/L      CO2 18 mmol/L      ANION GAP 5 mmol/L      BUN 8 mg/dL      Creatinine 0 50 mg/dL      Glucose 78 mg/dL      Calcium 6 4 mg/dL      Corrected Calcium 7 1 mg/dL      AST 88 U/L       U/L      Alkaline Phosphatase 67 U/L      Total Protein 5 0 g/dL      Albumin 3 1 g/dL      Total Bilirubin 0 34 mg/dL      eGFR 124 ml/min/1 73sq m     Narrative:      Balbir guidelines for Chronic Kidney Disease (CKD):   •  Stage 1 with normal or high GFR (GFR > 90 mL/min/1 73 square meters)  •  Stage 2 Mild CKD (GFR = 60-89 mL/min/1 73 square meters)  •  Stage 3A Moderate CKD (GFR = 45-59 mL/min/1 73 square meters)  •  Stage 3B Moderate CKD (GFR = 30-44 mL/min/1 73 square meters)  •  Stage 4 Severe CKD (GFR = 15-29 mL/min/1 73 square meters)  •  Stage 5 End Stage CKD (GFR <15 mL/min/1 73 square meters)  Note: GFR calculation is accurate only with a steady state creatinine    HS Troponin 0hr (reflex protocol) [[id number]]  (Normal) Collected: 06/22/23 1959    Lab Status: Final result Specimen: Blood from Arm, Right Updated: 06/22/23 2035     hs TnI 0hr <2 ng/L     Lipase [[id number]]  (Normal) Collected: 06/22/23 1959    Lab Status: Final result Specimen: Blood from Arm, Right Updated: 06/22/23 2026     Lipase 25 u/L     CBC and differential [[id number]] Collected: 06/22/23 1959    Lab Status: Final result Specimen: Blood from Arm, Right Updated: 06/22/23 2012     WBC 6 55 Thousand/uL      RBC 4 72 Million/uL      Hemoglobin 14 0 g/dL      Hematocrit 42 9 %      MCV 91 fL      MCH 29 7 pg      MCHC 32 6 g/dL      RDW 12 4 %      MPV 10 5 fL      Platelets 912 Thousands/uL      nRBC 0 /100 WBCs      Neutrophils Relative 57 %      Immat GRANS % 0 %      Lymphocytes Relative 31 %      Monocytes Relative 9 %      Eosinophils Relative 2 %      Basophils Relative 1 %      Neutrophils Absolute 3 76 Thousands/µL      Immature Grans Absolute 0 01 Thousand/uL      Lymphocytes Absolute 2 03 Thousands/µL      Monocytes Absolute 0 60 Thousand/µL      Eosinophils Absolute 0 11 Thousand/µL      Basophils Absolute 0 04 Thousands/µL     POCT pregnancy, urine [[id number]]  (Normal) Resulted: 06/22/23 2008    Lab Status: Final result Updated: 06/22/23 2009     EXT Preg Test, Ur Negative     Control Valid                 CT abdomen pelvis with contrast   Final Result by Gerald Sanchez DO (06/22 2156)   Atelectatic changes lingular lung base  Mild hepatomegaly  Mild central intrahepatic and extrahepatic biliary ductal dilatation which can be considered normal status post cholecystectomy   Serum bilirubin and serum lipase both appear to be normal       Colonic diverticulosis without evidence of diverticulitis  Workstation performed: [id number]                    Procedures  ECG 12 Lead Documentation Only    Date/Time: 6/23/2023 8:20 PM    Performed by: Kimberley Herrera PA-C  Authorized by: Kimberley Herrera PA-C    Indications / Diagnosis:  Epigastric pain  ECG reviewed by me, the ED Provider: yes    Patient location:  ED  Previous ECG:     Previous ECG:  Unavailable  Rate:     ECG rate:  59  Rhythm:     Rhythm: sinus bradycardia    Conduction:     Conduction: normal    ST segments:     ST segments:  Normal  T waves:     T waves: normal               ED Course             HEART Risk Score    Flowsheet Row Most Recent Value   Heart Score Risk Calculator    History 0 Filed at: 06/23/2023 0031   ECG 0 Filed at: 06/23/2023 0031   Age 0 Filed at: 06/23/2023 0031   Risk Factors 1 Filed at: 06/23/2023 0031   Troponin 0 Filed at: 06/23/2023 0031   HEART Score 1 Filed at: 06/23/2023 0031                        SBIRT 20yo+    Flowsheet Row Most Recent Value   Initial Alcohol Screen: US AUDIT-C     1  How often do you have a drink containing alcohol? 2 Filed at: 06/22/2023 2011   2  How many drinks containing alcohol do you have on a typical day you are drinking? 0 Filed at: 06/22/2023 2011   3a  Male UNDER 65: How often do you have five or more drinks on one occasion? 0 Filed at: 06/22/2023 2011   3b  FEMALE Any Age, or MALE 65+: How often do you have 4 or more drinks on one occassion? 0 Filed at: 06/22/2023 2011   Audit-C Score 2 Filed at: 06/22/2023 2011   ALICIA: How many times in the past year have you    Used an illegal drug or used a prescription medication for non-medical reasons? Never Filed at: 06/22/2023 2011                    Medical Decision Making  Patient with abdominal pain, will order labs, EKG CT scan to r/o pancreatitis, gastritis, colitis, cardiac disease  Patient with transaminitis, improved from prior visit  Patient denies alcohol use  Patient with hypokalemia, will give IV and oral potassium and magnesium  Advised f/u with PCP for recheck  Will refer to GI for hepatomegaly and transaminitis for further evaluation  Return precautions given  Abdominal pain: acute illness or injury  Hepatomegaly: acute illness or injury  Hypocalcemia: acute illness or injury  Hypokalemia: acute illness or injury  Transaminitis: chronic illness or injury  Amount and/or Complexity of Data Reviewed  External Data Reviewed: labs  Labs: ordered  Radiology: ordered  ECG/medicine tests: ordered and independent interpretation performed  Risk  Prescription drug management  Disposition  Final diagnoses:   Abdominal pain   Transaminitis   Hypokalemia   Hypocalcemia   Hepatomegaly     Time reflects when diagnosis was documented in both MDM as applicable and the Disposition within this note     Time User Action Codes Description Comment    6/22/2023 11:11 PM Naoma Lame Add [R10 9] Abdominal pain     6/22/2023 11:11 PM Naoma Lame Add [R74 01] Transaminitis     6/22/2023 11:11 PM Naoma Lame Add [E87 6] Hypokalemia     6/22/2023 11:11 PM Naoma Lame Add [E83 51] Hypocalcemia     6/22/2023 11:12 PM Naoma Lame Add [R16 0] Hepatomegaly       ED Disposition     ED Disposition   Discharge    Condition   Stable    Date/Time   Thu Jun 22, 2023 11:37 PM    [address] 250 discharge to home/self care  Follow-up Information     Follow up With Specialties Details Why Contact Info Additional [id number] [facility] Schedule an appointment as soon as possible for a visit  For recheck of elevated liver function tests and hepatomegaly    [address]  [address] [facility] [address], [address], [facility] [place], CRNP Nurse Practitioner Schedule an appointment as soon as possible for a visit  for recheck of potassium 5 [address] 7  [phone]             Discharge Medication List as of 6/22/2023 11:39 PM      CONTINUE these medications which have NOT CHANGED    Details   medroxyPROGESTERone (DEPO-PROVERA) 150 mg/mL injection Inject 150 mg into a muscle every 3 (three) months, Historical Med      clonazePAM (KlonoPIN) 0 5 mg tablet Take 1 tablet (0 5 mg total) by mouth 2 (two) times a day as needed for anxiety, Starting Mon 2/3/2020, Normal             No discharge procedures on file      PDMP Review       Value Time User    PDMP Reviewed  Yes 2/21/2020  8:06 PM Vanita Smith [place]          ED Provider  Electronically Signed by           Joo Roman PA-C  06/23/23 4418

## 2023-06-22 NOTE — ED NOTES
"Pt declines bloodwork in triage  Per patient, she \"just had bloodwork done at [facility] today so I don't think that's necessary  \"      Roma Tai RN  06/22/23 3276    "

## 2023-06-23 LAB
ATRIAL RATE: 59 BPM
B BURGDOR IGG+IGM SER-ACNC: 0.2 AI
P AXIS: 17 DEGREES
PR INTERVAL: 132 MS
QRS AXIS: -7 DEGREES
QRSD INTERVAL: 76 MS
QT INTERVAL: 430 MS
QTC INTERVAL: 425 MS
T WAVE AXIS: 9 DEGREES
VENTRICULAR RATE: 59 BPM

## 2023-06-23 PROCEDURE — 93010 ELECTROCARDIOGRAM REPORT: CPT | Performed by: INTERNAL MEDICINE

## 2023-06-23 NOTE — DISCHARGE INSTRUCTIONS
Rest, increase fluids  Motrin as needed for pain  Call GI tomorrow for a follow up appointment  Return to ER if symptoms worsen  Follow up with family doctor for recheck of potassium in 2-3 days

## 2023-07-06 ENCOUNTER — OFFICE VISIT (OUTPATIENT)
Dept: GASTROENTEROLOGY | Facility: CLINIC | Age: 38
End: 2023-07-06
Payer: COMMERCIAL

## 2023-07-06 VITALS
DIASTOLIC BLOOD PRESSURE: 80 MMHG | SYSTOLIC BLOOD PRESSURE: 106 MMHG | BODY MASS INDEX: 26.46 KG/M2 | TEMPERATURE: 98.8 F | WEIGHT: 155 LBS | HEIGHT: 64 IN

## 2023-07-06 DIAGNOSIS — R10.13 EPIGASTRIC ABDOMINAL PAIN: Primary | ICD-10-CM

## 2023-07-06 DIAGNOSIS — R74.8 ELEVATED LIVER ENZYMES: ICD-10-CM

## 2023-07-06 DIAGNOSIS — R11.0 NAUSEA: ICD-10-CM

## 2023-07-06 PROCEDURE — 99203 OFFICE O/P NEW LOW 30 MIN: CPT | Performed by: PHYSICIAN ASSISTANT

## 2023-07-06 RX ORDER — ONDANSETRON 4 MG/1
TABLET, ORALLY DISINTEGRATING ORAL
COMMUNITY
Start: 2023-06-26

## 2023-07-06 RX ORDER — TRAMADOL HYDROCHLORIDE 50 MG/1
TABLET ORAL
COMMUNITY
Start: 2023-06-26

## 2023-07-06 RX ORDER — FAMOTIDINE 20 MG/1
20 TABLET, FILM COATED ORAL 2 TIMES DAILY
Qty: 60 TABLET | Refills: 2 | Status: SHIPPED | OUTPATIENT
Start: 2023-07-06

## 2023-07-06 NOTE — PROGRESS NOTES
[facility] - Outpatient Consultation  Thelma Zepeda 41 y.o. female MRN: [id number]  Encounter: [id number]          ASSESSMENT AND PLAN:      1. Epigastric abdominal pain  2. Nausea  She complains of progressive, epigastric pain and nausea over the past 3 weeks. Pain is worsened by p.o. intake. She denies NSAIDs. She is status post cholecystectomy. Work-up in the ED significant for abnormal liver enzymes, but CT scan negative for acute pathology. Recommend EGD to evaluate for peptic ulcer disease, gastritis, H. pylori infection, erosive reflux disease. Check RUQ ultrasound due to abnormal liver enzymes. Start Pepcid 20 mg twice daily. She can stop taking Zofran since this is not helping.    - famotidine (PEPCID) 20 mg tablet; Take 1 tablet (20 mg total) by mouth 2 (two) times a day  Dispense: 60 tablet; Refill: 2  - EGD; Future    3. Elevated liver enzymes  It appears she has had chronic elevation of AST/ALT going back to at least 2016. AST 88, , alk phos and bilirubin normal. Albumin slightly low 3.1. Platelets normal. CT A/P with IV contrast shows mild hepatomegaly, dilated intrahepatic ducts consistent with previous cholecystectomy, otherwise no acute pathology. No family history of liver disease. No hepatotoxic medications or supplements. I recommend broad liver work-up to assess for viral, autoimmune, metabolic, and genetic causes. If her blood work-up is unremarkable, would recommend liver biopsy as the next step. - Hepatic function panel; Future  - US right upper quadrant; Future  - Anti-smooth muscle antibody, IgG  - Antimitochondrial antibody  - Ceruloplasmin  - Alpha-1-antitrypsin; Future  - Chronic Hepatitis Panel; Future  - Iron Panel (Includes Ferritin, Iron Sat%, Iron, and TIBC); Future  - IgG, IgA, IgM; Future  - Celiac Disease Antibody Profile;  Future    Follow-up after procedure  ______________________________________________________________________    HPI: 42-year-old female with history of chronic migraines referred by the ER for abdominal pain and elevated liver enzymes. Patient reports dull epigastric pain for the past 3 weeks which has been progressive. The pain is a dull sensation and radiates into her back. The pain has been constant but worsened by any food intake. She has nausea which is not relieved by Zofran. She did vomit on Sunday. No reports of blood in the vomit. She denies history of chronic reflux or trouble swallowing. She took NSAIDs in the past for chronic migraines but has been off for some time since these cause GI upset. She has some chronic constipation which is unchanged. She denies any black or bloody stools. She lost 35 pounds since January since cutting down on portions and watching her diet. She denies family history of liver disease. She does not take any hepatotoxic medications, herbal supplements, over-the-counter drugs. She denies history of IV drug use. She drinks alcohol rarely. She had blood work done with her PCP at [facility] which showed positive JORDAN. She had cholecystectomy in the past.    She has never had an EGD or colonoscopy. Answers for HPI/ROS submitted by the patient on 7/2/2023  Chronicity: new  Onset: 1 to 4 weeks ago  Onset quality: gradual  Frequency: constantly  Episode duration: 3 Weeks  Progression since onset: waxing and waning  Pain location: RUQ  Pain - numeric: 6/10  Pain quality: cramping, dull, tearing  Radiates to: RLQ, epigastric region, back  anorexia: Yes  arthralgias: Yes  belching: No  constipation: Yes  diarrhea: Yes  dysuria: No  fever: No  flatus: No  frequency: No  headaches: Yes  hematochezia: No  hematuria: No  melena: No  myalgias: No  nausea: Yes  weight loss: Yes  vomiting: No  Aggravated by: certain positions  Relieved by: nothing  Diagnostic workup: CT scan          REVIEW OF SYSTEMS:    CONSTITUTIONAL: Denies any fever, chills, rigors, and weight loss.   HEENT: No earache or tinnitus. Denies hearing loss or visual disturbances. CARDIOVASCULAR: No chest pain or palpitations. RESPIRATORY: Denies any cough, hemoptysis, shortness of breath or dyspnea on exertion. GASTROINTESTINAL: As noted in the History of Present Illness. GENITOURINARY: No problems with urination. Denies any hematuria or dysuria. NEUROLOGIC: No dizziness or vertigo, denies headaches. MUSCULOSKELETAL: Denies any muscle or joint pain. SKIN: Denies skin rashes or itching. ENDOCRINE: Denies excessive thirst. Denies intolerance to heat or cold. PSYCHOSOCIAL: Denies depression or anxiety. Denies any recent memory loss. Historical Information   Past Medical History:   Diagnosis Date   • Migraine      Past Surgical History:   Procedure Laterality Date   • CHOLECYSTECTOMY       Social History   Social History     Substance and Sexual Activity   Alcohol Use Yes    Comment: socially     Social History     Substance and Sexual Activity   Drug Use No     Social History     Tobacco Use   Smoking Status Former   • Types: Cigarettes   Smokeless Tobacco Never     Family History   Problem Relation Age of Onset   • No Known Problems Mother    • No Known Problems Father        Meds/Allergies       Current Outpatient Medications:   •  clonazePAM (KlonoPIN) 0.5 mg tablet  •  medroxyPROGESTERone (DEPO-PROVERA) 150 mg/mL injection  •  ondansetron (ZOFRAN-ODT) 4 mg disintegrating tablet  •  traMADol (ULTRAM) 50 mg tablet    Allergies   Allergen Reactions   • Bee Venom      Other reaction(s): anaphylaxis           Objective     Blood pressure 106/80, temperature 98.8 °F (37.1 °C), temperature source Tympanic, height 5' 4" (1.626 m), weight 70.3 kg (155 lb). Body mass index is 26.61 kg/m².         PHYSICAL EXAM:      General Appearance:   Alert, cooperative, no distress   HEENT:   Normocephalic, atraumatic, anicteric.     Neck:  Supple, symmetrical, trachea midline   Lungs:   Clear to auscultation bilaterally; no rales, rhonchi or wheezing; respirations unlabored    Heart[de-identified]   Regular rate and rhythm; no murmur, rub, or gallop. Abdomen:   Soft, mild epigastric tenderness, non-distended; normal bowel sounds; no masses, no organomegaly    Genitalia:   Deferred    Rectal:   Deferred    Extremities:  No cyanosis, clubbing or edema    Pulses:  2+ and symmetric    Skin:  No jaundice, rashes, or lesions    Lymph nodes:  No palpable cervical lymphadenopathy        Lab Results:   No visits with results within 1 Day(s) from this visit.    Latest known visit with results is:   Admission on 06/22/2023, Discharged on 06/22/2023   Component Date Value   • WBC 06/22/2023 6.55    • RBC 06/22/2023 4.72    • Hemoglobin 06/22/2023 14.0    • Hematocrit 06/22/2023 42.9    • MCV 06/22/2023 91    • MCH 06/22/2023 29.7    • MCHC 06/22/2023 32.6    • RDW 06/22/2023 12.4    • MPV 06/22/2023 10.5    • Platelets 95/81/9149 236    • nRBC 06/22/2023 0    • Neutrophils Relative 06/22/2023 57    • Immat GRANS % 06/22/2023 0    • Lymphocytes Relative 06/22/2023 31    • Monocytes Relative 06/22/2023 9    • Eosinophils Relative 06/22/2023 2    • Basophils Relative 06/22/2023 1    • Neutrophils Absolute 06/22/2023 3.76    • Immature Grans Absolute 06/22/2023 0.01    • Lymphocytes Absolute 06/22/2023 2.03    • Monocytes Absolute 06/22/2023 0.60    • Eosinophils Absolute 06/22/2023 0.11    • Basophils Absolute 06/22/2023 0.04    • Sodium 06/22/2023 141    • Potassium 06/22/2023 2.7 (LL)    • Chloride 06/22/2023 118 (H)    • CO2 06/22/2023 18 (L)    • ANION GAP 06/22/2023 5    • BUN 06/22/2023 8    • Creatinine 06/22/2023 0.50 (L)    • Glucose 06/22/2023 78    • Calcium 06/22/2023 6.4 (L)    • Corrected Calcium 06/22/2023 7.1 (L)    • AST 06/22/2023 88 (H)    • ALT 06/22/2023 180 (H)    • Alkaline Phosphatase 06/22/2023 67    • Total Protein 06/22/2023 5.0 (L)    • Albumin 06/22/2023 3.1 (L)    • Total Bilirubin 06/22/2023 0.34    • eGFR 06/22/2023 124    • EXT Preg Test, Ur 06/22/2023 Negative    • Control 06/22/2023 Valid    • Lipase 06/22/2023 25    • hs TnI 0hr 06/22/2023 <2    • Ventricular Rate 06/22/2023 59    • Atrial Rate 06/22/2023 59    • CA Interval 06/22/2023 132    • QRSD Interval 06/22/2023 76    • QT Interval 06/22/2023 430    • QTC Interval 06/22/2023 425    • P Axis 06/22/2023 17    • QRS Axis 06/22/2023 -7    • T Wave Axis 06/22/2023 9    • Lyme Total Antibodies 06/22/2023 0.2    • Color, UA 06/22/2023 Light Yellow    • Clarity, UA 06/22/2023 Clear    • Specific Gravity, UA 06/22/2023 1.010    • pH, UA 06/22/2023 5.5    • Leukocytes, UA 06/22/2023 Negative    • Nitrite, UA 06/22/2023 Negative    • Protein, UA 06/22/2023 Trace (A)    • Glucose, UA 06/22/2023 Negative    • Ketones, UA 06/22/2023 10 (1+) (A)    • Urobilinogen, UA 06/22/2023 <2.0    • Bilirubin, UA 06/22/2023 Negative    • Occult Blood, UA 06/22/2023 Negative    • RBC, UA 06/22/2023 None Seen    • WBC, UA 06/22/2023 1-2    • Epithelial Cells 06/22/2023 Occasional    • Bacteria, UA 06/22/2023 None Seen          Radiology Results:   CT abdomen pelvis with contrast    Result Date: 6/22/2023  Narrative: CT ABDOMEN AND PELVIS WITH IV CONTRAST INDICATION:   Epigastric pain epigastric pain. COMPARISON: October 26, 2016 TECHNIQUE:  CT examination of the abdomen and pelvis was performed. Multiplanar 2D reformatted images were created from the source data. This examination, like all CT scans performed in the [facility], was performed utilizing techniques to minimize radiation dose exposure, including the use of iterative reconstruction and automated exposure control. Radiation dose length product (DLP) for this visit:  462.05 mGy-cm IV Contrast:  100 mL of iohexol (OMNIPAQUE) Enteric Contrast:  Enteric contrast was not administered. FINDINGS: ABDOMEN LOWER CHEST:  No clinically significant abnormality identified in the visualized lower chest. Atelectatic changes lingular base. LIVER/BILIARY TREE: Mildly enlarged. Intrahepatic biliary ductal dilatation considered normal status post cholecystectomy. No choledocholithiasis identified. GALLBLADDER: Removed SPLEEN:  Unremarkable. PANCREAS:  Unremarkable. ADRENAL GLANDS:  Unremarkable. KIDNEYS/URETERS:  Unremarkable. No hydronephrosis. STOMACH AND BOWEL: Limited evaluation of GI tract without oral contrast. Unremarkable stomach. Small bowel unremarkable. Large bowel unremarkable. Colonic diverticulosis without evidence of colitis or diverticulitis. APPENDIX: Appendix not identified. No evidence of appendicitis. ABDOMINOPELVIC CAVITY:  No ascites. No pneumoperitoneum. No lymphadenopathy. VESSELS:  Unremarkable for patient's age. PELVIS REPRODUCTIVE ORGANS:  Unremarkable for patient's age. URINARY BLADDER:  Unremarkable. ABDOMINAL WALL/INGUINAL REGIONS: Tiny fat-containing umbilical hernia. OSSEOUS STRUCTURES:  No acute fracture or destructive osseous lesion. Impression: Atelectatic changes lingular lung base. Mild hepatomegaly. Mild central intrahepatic and extrahepatic biliary ductal dilatation which can be considered normal status post cholecystectomy. Serum bilirubin and serum lipase both appear to be normal. Colonic diverticulosis without evidence of diverticulitis.  Workstation performed: [id number]

## 2023-07-06 NOTE — PATIENT INSTRUCTIONS
Scheduled date of EGD(as of today):07/14/2023  Physician performing EGD:Dr Landa  Location of EGD:[facility]  Instructions reviewed with patient by:Nancy  Clearances:  n/a

## 2023-07-07 ENCOUNTER — HOSPITAL ENCOUNTER (OUTPATIENT)
Dept: ULTRASOUND IMAGING | Facility: HOSPITAL | Age: 38
End: 2023-07-07
Payer: COMMERCIAL

## 2023-07-07 DIAGNOSIS — R74.8 ELEVATED LIVER ENZYMES: ICD-10-CM

## 2023-07-07 PROCEDURE — 76705 ECHO EXAM OF ABDOMEN: CPT

## 2023-07-14 ENCOUNTER — ANESTHESIA EVENT (OUTPATIENT)
Dept: GASTROENTEROLOGY | Facility: HOSPITAL | Age: 38
End: 2023-07-14

## 2023-07-14 ENCOUNTER — HOSPITAL ENCOUNTER (OUTPATIENT)
Dept: GASTROENTEROLOGY | Facility: HOSPITAL | Age: 38
Setting detail: OUTPATIENT SURGERY
End: 2023-07-14
Payer: COMMERCIAL

## 2023-07-14 ENCOUNTER — ANESTHESIA (OUTPATIENT)
Dept: GASTROENTEROLOGY | Facility: HOSPITAL | Age: 38
End: 2023-07-14

## 2023-07-14 VITALS
DIASTOLIC BLOOD PRESSURE: 63 MMHG | OXYGEN SATURATION: 99 % | RESPIRATION RATE: 18 BRPM | SYSTOLIC BLOOD PRESSURE: 109 MMHG | TEMPERATURE: 98 F | WEIGHT: 154.32 LBS | HEIGHT: 64 IN | BODY MASS INDEX: 26.35 KG/M2 | HEART RATE: 75 BPM

## 2023-07-14 DIAGNOSIS — R10.13 EPIGASTRIC ABDOMINAL PAIN: ICD-10-CM

## 2023-07-14 DIAGNOSIS — R11.0 NAUSEA: ICD-10-CM

## 2023-07-14 LAB
EXT PREGNANCY TEST URINE: NEGATIVE
EXT. CONTROL: NORMAL

## 2023-07-14 PROCEDURE — 88305 TISSUE EXAM BY PATHOLOGIST: CPT | Performed by: PATHOLOGY

## 2023-07-14 PROCEDURE — 81025 URINE PREGNANCY TEST: CPT | Performed by: STUDENT IN AN ORGANIZED HEALTH CARE EDUCATION/TRAINING PROGRAM

## 2023-07-14 PROCEDURE — 43239 EGD BIOPSY SINGLE/MULTIPLE: CPT | Performed by: INTERNAL MEDICINE

## 2023-07-14 PROCEDURE — 88342 IMHCHEM/IMCYTCHM 1ST ANTB: CPT | Performed by: PATHOLOGY

## 2023-07-14 RX ORDER — SODIUM CHLORIDE 9 MG/ML
INJECTION, SOLUTION INTRAVENOUS CONTINUOUS PRN
Status: DISCONTINUED | OUTPATIENT
Start: 2023-07-14 | End: 2023-07-14

## 2023-07-14 RX ORDER — LIDOCAINE HYDROCHLORIDE 10 MG/ML
INJECTION, SOLUTION EPIDURAL; INFILTRATION; INTRACAUDAL; PERINEURAL AS NEEDED
Status: DISCONTINUED | OUTPATIENT
Start: 2023-07-14 | End: 2023-07-14

## 2023-07-14 RX ORDER — PROPOFOL 10 MG/ML
INJECTION, EMULSION INTRAVENOUS AS NEEDED
Status: DISCONTINUED | OUTPATIENT
Start: 2023-07-14 | End: 2023-07-14

## 2023-07-14 RX ADMIN — PROPOFOL 50 MG: 10 INJECTION, EMULSION INTRAVENOUS at 08:47

## 2023-07-14 RX ADMIN — PROPOFOL 100 MG: 10 INJECTION, EMULSION INTRAVENOUS at 08:44

## 2023-07-14 RX ADMIN — SODIUM CHLORIDE: 0.9 INJECTION, SOLUTION INTRAVENOUS at 08:25

## 2023-07-14 RX ADMIN — PROPOFOL 50 MG: 10 INJECTION, EMULSION INTRAVENOUS at 08:50

## 2023-07-14 RX ADMIN — PROPOFOL 50 MG: 10 INJECTION, EMULSION INTRAVENOUS at 08:45

## 2023-07-14 RX ADMIN — LIDOCAINE HYDROCHLORIDE 80 MG: 10 INJECTION, SOLUTION EPIDURAL; INFILTRATION; INTRACAUDAL; PERINEURAL at 08:44

## 2023-07-14 NOTE — ANESTHESIA PREPROCEDURE EVALUATION
Procedure:  EGD    Relevant Problems   No relevant active problems        Physical Exam    Airway    Mallampati score: II  TM Distance: >3 FB  Neck ROM: full     Dental   Comment: none loose, No notable dental hx     Cardiovascular      Pulmonary      Other Findings        Anesthesia Plan  ASA Score- 1     Anesthesia Type- IV sedation with anesthesia with ASA Monitors. Additional Monitors:   Airway Plan:     Comment: NPO after MN    Patient educated on the possibility for awareness under sedation and of the possibility of airway intervention in the event of an airway or procedural emergency      Urine hcg = negative. Plan Factors-Exercise tolerance (METS): >4 METS. Chart reviewed. Existing labs reviewed. Patient summary reviewed. Patient is not a current smoker. Induction- intravenous. Postoperative Plan-     Informed Consent- Anesthetic plan and risks discussed with patient. I personally reviewed this patient with the CRNA. Discussed and agreed on the Anesthesia Plan with the CRNA. Albin Graham

## 2023-07-14 NOTE — H&P
History and Physical -  [facility]  Jennifer Westfall 41 y.o. female MRN: [id number]        HPI: Jennifer Westfall is a 41y.o. year old female who presents for evaluation of nausea and epigastric pain. REVIEW OF SYSTEMS: Per the HPI, and otherwise unremarkable. Historical Information   Past Medical History:   Diagnosis Date   • Migraine      Past Surgical History:   Procedure Laterality Date   • CHOLECYSTECTOMY       Social History   Social History     Substance and Sexual Activity   Alcohol Use Yes    Comment: socially     Social History     Substance and Sexual Activity   Drug Use No     Social History     Tobacco Use   Smoking Status Former   • Types: Cigarettes   Smokeless Tobacco Never     Family History   Problem Relation Age of Onset   • No Known Problems Mother    • No Known Problems Father        Meds/Allergies       Current Outpatient Medications:   •  clonazePAM (KlonoPIN) 0.5 mg tablet  •  traMADol (ULTRAM) 50 mg tablet  •  famotidine (PEPCID) 20 mg tablet  •  medroxyPROGESTERone (DEPO-PROVERA) 150 mg/mL injection  •  ondansetron (ZOFRAN-ODT) 4 mg disintegrating tablet    Allergies   Allergen Reactions   • Bee Venom      Other reaction(s): anaphylaxis       Objective     /66   Pulse 72   Temp (!) 96.8 °F (36 °C) (Temporal)   Resp 16   Ht 5' 4" (1.626 m)   Wt 70 kg (154 lb 5.2 oz)   SpO2 99%   BMI 26.49 kg/m²       PHYSICAL EXAM    Gen: NAD  Head: NCAT  CV: RRR  CHEST: Clear  ABD: soft, NT/ND  EXT: no edema      ASSESSMENT/PLAN:  This is a 41y.o. year old female here for EGD, and she is stable and optimized for her procedure.

## 2023-07-14 NOTE — ANESTHESIA POSTPROCEDURE EVALUATION
Post-Op Assessment Note    CV Status:  Stable  Pain Score: 0    Pain management: adequate     Mental Status:  Alert, awake and arousable   Hydration Status:  Euvolemic   PONV Controlled:  Controlled   Airway Patency:  Patent      Post Op Vitals Reviewed: Yes      Staff: CRNA         No notable events documented.     BP   109/63   Temp     Pulse  73   Resp   20   SpO2   98

## 2023-07-15 LAB
A1AT SERPL-MCNC: 141 MG/DL (ref 100–188)
ACTIN IGG SERPL-ACNC: 67 UNITS (ref 0–19)
ALBUMIN SERPL-MCNC: 4.7 G/DL (ref 3.9–4.9)
ALP SERPL-CCNC: 73 IU/L (ref 44–121)
ALT SERPL-CCNC: 17 IU/L (ref 0–32)
AST SERPL-CCNC: 25 IU/L (ref 0–40)
BILIRUB DIRECT SERPL-MCNC: 0.17 MG/DL (ref 0–0.4)
BILIRUB SERPL-MCNC: 0.6 MG/DL (ref 0–1.2)
CERULOPLASMIN SERPL-MCNC: 21.8 MG/DL (ref 19–39)
ENDOMYSIUM IGA SER QL: POSITIVE
FERRITIN SERPL-MCNC: 22 NG/ML (ref 15–150)
HBV CORE AB SERPL QL IA: NEGATIVE
HBV CORE IGM SERPL QL IA: NEGATIVE
HBV SURFACE AG SERPL QL IA: NEGATIVE
HCV AB S/CO SERPL IA: NON REACTIVE
IGA SERPL-MCNC: 224 MG/DL (ref 87–352)
IGG SERPL-MCNC: 895 MG/DL (ref 586–1602)
IGM SERPL-MCNC: 200 MG/DL (ref 26–217)
IRON SATN MFR SERPL: 37 % (ref 15–55)
IRON SERPL-MCNC: 144 UG/DL (ref 27–159)
MITOCHONDRIA M2 IGG SER-ACNC: <20 UNITS (ref 0–20)
PROT SERPL-MCNC: 7 G/DL (ref 6–8.5)
TIBC SERPL-MCNC: 385 UG/DL (ref 250–450)
TTG IGA SER-ACNC: >100 U/ML (ref 0–3)
UIBC SERPL-MCNC: 241 UG/DL (ref 131–425)

## 2023-07-19 PROCEDURE — 88305 TISSUE EXAM BY PATHOLOGIST: CPT | Performed by: PATHOLOGY

## 2023-07-19 PROCEDURE — 88342 IMHCHEM/IMCYTCHM 1ST ANTB: CPT | Performed by: PATHOLOGY

## 2023-07-25 DIAGNOSIS — K90.0 CELIAC DISEASE: Primary | ICD-10-CM

## 2023-08-03 DIAGNOSIS — R10.13 EPIGASTRIC ABDOMINAL PAIN: ICD-10-CM

## 2023-08-03 RX ORDER — FAMOTIDINE 20 MG/1
20 TABLET, FILM COATED ORAL 2 TIMES DAILY
Qty: 180 TABLET | Refills: 1 | Status: SHIPPED | OUTPATIENT
Start: 2023-08-03

## 2023-08-25 LAB
25(OH)D3+25(OH)D2 SERPL-MCNC: 40.9 NG/ML (ref 30–100)
FERRITIN SERPL-MCNC: 37 NG/ML (ref 15–150)
FOLATE SERPL-MCNC: 3.7 NG/ML
IRON SATN MFR SERPL: 22 % (ref 15–55)
IRON SERPL-MCNC: 70 UG/DL (ref 27–159)
TIBC SERPL-MCNC: 325 UG/DL (ref 250–450)
UIBC SERPL-MCNC: 255 UG/DL (ref 131–425)
VIT B12 SERPL-MCNC: 670 PG/ML (ref 232–1245)

## 2023-08-28 ENCOUNTER — TELEPHONE (OUTPATIENT)
Age: 38
End: 2023-08-28

## 2023-08-28 NOTE — TELEPHONE ENCOUNTER
Patients GI provider:  Dr. Jamaal Foley     Number to return call: n/a    Reason for call: Pt calling to respond to Dr. Jamaal Foley regarding her lab work results. She wanted to let the doctor know she recevied the results and will be attending her appointment on 9/14/23. [facility] was not allowing her to respond.     Scheduled procedure/appointment date if applicable: Apt/procedure - 9/14

## 2024-02-28 ENCOUNTER — TELEPHONE (OUTPATIENT)
Age: 39
End: 2024-02-28

## 2024-02-28 NOTE — TELEPHONE ENCOUNTER
"Last OV 7/6/23 Nicki Galindo PA-C for Epigastric abdominal pain, Nausea, Elevated liver enzymes   Next OV 6/18/24  EGD 7/14/23  US URQ 7/7/23  CT abdomen 6/22/23    Pt has been taking DayQuil for cold and has noted pain \"on my L side under rib\" soon after taking with improvement between doses. Has celiac and inquiring if ingredients contain gluten. Dayquil is gluten free. Pt denies any other symptoms.     Advised pt DayQuil contains acetaminophen. Does not believe she has had issues with other active ingredients in the past. Does not have pain or fever at this time; only complains of nasal congestion. I advised pt take medications w/o acetaminophen to see if there is a correlation. Pt verbalized understanding.  "

## 2024-02-28 NOTE — TELEPHONE ENCOUNTER
Patient contacted office with questions regarding gluten allergy and over the counter cold medications. Call transferred to nurse triage to further assist.

## 2024-10-07 ENCOUNTER — TELEPHONE (OUTPATIENT)
Age: 39
End: 2024-10-07

## 2024-10-07 DIAGNOSIS — K90.0 CELIAC DISEASE: Primary | ICD-10-CM

## 2024-10-07 NOTE — TELEPHONE ENCOUNTER
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

## 2024-10-07 NOTE — TELEPHONE ENCOUNTER
Pt. Recently had cold symptoms and feels due to her Celiac she was having joint pain, pt. States when she started with cough her pain improved, reviewed autoimmune effects of Celiac, pt is asking if there is maintenance blood work that needs to be checked routinely, please advise

## 2025-06-09 ENCOUNTER — APPOINTMENT (OUTPATIENT)
Dept: URGENT CARE | Facility: CLINIC | Age: 40
End: 2025-06-09

## 2025-06-12 ENCOUNTER — TELEPHONE (OUTPATIENT)
Age: 40
End: 2025-06-12

## 2025-06-12 NOTE — TELEPHONE ENCOUNTER
Patients GI provider:  Nicki Galindo    Number to return call: [phone]    Reason for call: Pt calling to request her lab slips be mailed to her because she is unable to print them from her Before the Callt.     Scheduled procedure/appointment date if applicable: Appt 7/11/25

## 2025-06-14 LAB — HBA1C MFR BLD HPLC: 5.1 %

## 2025-06-18 NOTE — TELEPHONE ENCOUNTER
Pt called to ask if she would have to have the labs done if she had just had for her PCP. Pt had the labs done at [facility] [place]. Advised pt she would have to have the results sent to the office because they do not show up in her chart.

## 2025-06-23 ENCOUNTER — TELEPHONE (OUTPATIENT)
Age: 40
End: 2025-06-23

## 2025-06-23 NOTE — TELEPHONE ENCOUNTER
Patient called to schedule a new patient appointment with one of our providers. She has never seen a rheumatologist before. I advised her that we would need a doctor's referral first prior to getting her scheduled. I provided our fax number [phone]. Patient will reach out to family doctor at [facility] to obtain referral. I advised patient that once she hears word from her PCP that they faxed over the referral, to please give us 24 or 48 hours to upload the referral in her chart. She states she will call back sometime Thursday to check on the status.       Thank you.

## 2025-07-03 NOTE — TELEPHONE ENCOUNTER
Patient called in stating that she was told that she does not need a referral to Rheumatology per her insurance and would like to make an appt. I advised patient that our office is not asking for an insurance referral, we are asking for a doctor to doctor referral which is different. She states that she does not understand because she sees [facility] and she did not need a referral to see them. I let patient know that each specialty with [facility] has their own requirements so some specialties you need a referral for and some you do not. I let patient know that Rheumatology will only schedule appts for new patients if they have a referral. After explaining to patient she shea that she will call her PCP and let them know.

## 2025-07-09 ENCOUNTER — OFFICE VISIT (OUTPATIENT)
Dept: URGENT CARE | Facility: CLINIC | Age: 40
End: 2025-07-09
Payer: COMMERCIAL

## 2025-07-09 ENCOUNTER — APPOINTMENT (OUTPATIENT)
Dept: RADIOLOGY | Facility: CLINIC | Age: 40
End: 2025-07-09
Payer: COMMERCIAL

## 2025-07-09 VITALS
DIASTOLIC BLOOD PRESSURE: 76 MMHG | WEIGHT: 190 LBS | OXYGEN SATURATION: 99 % | RESPIRATION RATE: 16 BRPM | SYSTOLIC BLOOD PRESSURE: 130 MMHG | TEMPERATURE: 98.5 F | HEIGHT: 64 IN | BODY MASS INDEX: 32.44 KG/M2 | HEART RATE: 85 BPM

## 2025-07-09 DIAGNOSIS — M79.645 PAIN OF FINGER OF LEFT HAND: ICD-10-CM

## 2025-07-09 DIAGNOSIS — M79.645 PAIN OF FINGER OF LEFT HAND: Primary | ICD-10-CM

## 2025-07-09 PROCEDURE — 99213 OFFICE O/P EST LOW 20 MIN: CPT | Performed by: PHYSICIAN ASSISTANT

## 2025-07-09 PROCEDURE — 73140 X-RAY EXAM OF FINGER(S): CPT

## 2025-07-09 NOTE — PROGRESS NOTES
[facility]  Name: Angelica Cedillo      : 1985      MRN: [id number]  Encounter Provider: Nasir Merritt PA-C  Encounter Date: 2025   Encounter department: [facility]  :  Assessment & Plan        Patient Instructions  Follow up with PCP in 3-5 days.  Proceed to  ER if symptoms worsen.    If tests are performed, our office will contact you with results only if changes need to made to the care plan discussed with you at the visit. [facility]    Chief Complaint:   Chief Complaint   Patient presents with   • Finger Pain     Pt reports she hit her left middle finger with a hammer 30 minutes ago. Applied ice.      History of Present Illness   Patient presents with pain over the MCP of the left middle finger.  Started after hitting her finger with a hammer on accident.  Now with pain over the area.  Denies numbness/tingling or inability to use the finger..  Area is a little bruised.      Review of Systems   Musculoskeletal:  Positive for arthralgias and joint swelling.   Skin:  Positive for color change.   Neurological:  Negative for weakness and numbness.   Past Medical History   Past Medical History[1]  Past Surgical History[2]  Family History[3]  she reports that she has quit smoking. Her smoking use included cigarettes. She has never used smokeless tobacco. She reports current alcohol use. She reports that she does not use drugs.  Current Outpatient Medications   Medication Instructions   • clonazePAM (KLONOPIN) 0.5 mg, Oral, 2 times daily PRN   • famotidine (PEPCID) 20 mg, Oral, 2 times daily   • medroxyPROGESTERone (DEPO-PROVERA) 150 mg, Every 3 months   • ondansetron (ZOFRAN-ODT) 4 mg disintegrating tablet 1 TABLET ON THE TONGUE AND ALLOW TO DISSOLVE ORALLY THREE TIMES A DAY   • traMADol (ULTRAM) 50 mg tablet TAKE 1 TABLET BY MOUTH TWICE A DAY FOR 14 DAYS AS NEEDED   Allergies[4]     Objective   There were no vitals taken for this  "visit.     Physical Exam  Constitutional:       Appearance: Normal appearance.     Musculoskeletal:         General: Tenderness present. No swelling or deformity. Normal range of motion.     Skin:     Comments: Very mild ecchymosis around the third MCP joint of the left hand.  Full active range of motion with 5 out of 5 muscle strength of the finger/hand noted.  Neurovascularly intact distally.     Neurological:      Mental Status: She is alert.     Psychiatric:         Mood and Affect: Mood normal.         Behavior: Behavior normal.     Portions of the record may have been created with voice recognition software.  Occasional wrong word or \"sound a like\" substitutions may have occurred due to the inherent limitations of voice recognition software.  Read the chart carefully and recognize, using context, where substitutions have occurred.         [1]  Past Medical History:  Diagnosis Date   • Migraine    [2]  Past Surgical History:  Procedure Laterality Date   • CHOLECYSTECTOMY     [3]  Family History  Problem Relation Name Age of Onset   • No Known Problems Mother     • No Known Problems Father     [4]  Allergies  Allergen Reactions   • Bee Venom      Other reaction(s): anaphylaxis   "

## 2025-07-25 ENCOUNTER — TELEPHONE (OUTPATIENT)
Dept: GASTROENTEROLOGY | Facility: CLINIC | Age: 40
End: 2025-07-25

## 2025-07-25 NOTE — TELEPHONE ENCOUNTER
I called and left a message for the patient to call back to reschedule her office visit with Nicki that is scheduled for 12/31/25 due to a change in her schedule.

## 2025-08-08 ENCOUNTER — APPOINTMENT (OUTPATIENT)
Dept: RADIOLOGY | Facility: CLINIC | Age: 40
End: 2025-08-08
Payer: COMMERCIAL

## 2025-08-08 ENCOUNTER — OFFICE VISIT (OUTPATIENT)
Dept: GASTROENTEROLOGY | Facility: AMBULARY SURGERY CENTER | Age: 40
End: 2025-08-08
Payer: COMMERCIAL

## 2025-08-08 VITALS
HEIGHT: 64 IN | DIASTOLIC BLOOD PRESSURE: 86 MMHG | OXYGEN SATURATION: 99 % | BODY MASS INDEX: 32.61 KG/M2 | SYSTOLIC BLOOD PRESSURE: 142 MMHG | HEART RATE: 89 BPM

## 2025-08-08 DIAGNOSIS — K92.1 BLOOD IN STOOL: ICD-10-CM

## 2025-08-08 DIAGNOSIS — K90.0 CELIAC DISEASE: Primary | ICD-10-CM

## 2025-08-08 DIAGNOSIS — K52.9 CHRONIC DIARRHEA: ICD-10-CM

## 2025-08-08 DIAGNOSIS — M25.50 PAIN IN JOINT, MULTIPLE SITES: ICD-10-CM

## 2025-08-08 DIAGNOSIS — R10.13 EPIGASTRIC PAIN: ICD-10-CM

## 2025-08-08 DIAGNOSIS — R10.9 ABDOMINAL CRAMPING: ICD-10-CM

## 2025-08-08 PROCEDURE — 99214 OFFICE O/P EST MOD 30 MIN: CPT | Performed by: PHYSICIAN ASSISTANT

## 2025-08-08 RX ORDER — DICYCLOMINE HYDROCHLORIDE 10 MG/1
10 CAPSULE ORAL 3 TIMES DAILY PRN
Qty: 30 CAPSULE | Refills: 3 | Status: SHIPPED | OUTPATIENT
Start: 2025-08-08

## 2025-08-08 RX ORDER — SODIUM, POTASSIUM,MAG SULFATES 17.5-3.13G
1 SOLUTION, RECONSTITUTED, ORAL ORAL ONCE
Qty: 354 ML | Refills: 0 | Status: SHIPPED | OUTPATIENT
Start: 2025-08-08 | End: 2025-08-08

## 2025-08-08 RX ORDER — SODIUM CHLORIDE, SODIUM LACTATE, POTASSIUM CHLORIDE, CALCIUM CHLORIDE 600; 310; 30; 20 MG/100ML; MG/100ML; MG/100ML; MG/100ML
125 INJECTION, SOLUTION INTRAVENOUS CONTINUOUS
OUTPATIENT
Start: 2025-08-08

## 2025-08-19 ENCOUNTER — APPOINTMENT (OUTPATIENT)
Dept: LAB | Facility: HOSPITAL | Age: 40
End: 2025-08-19
Payer: COMMERCIAL

## 2025-08-19 DIAGNOSIS — R10.13 EPIGASTRIC PAIN: ICD-10-CM

## 2025-08-19 LAB
ALBUMIN SERPL BCG-MCNC: 4.9 G/DL (ref 3.5–5)
ALP SERPL-CCNC: 62 U/L (ref 34–104)
ALT SERPL W P-5'-P-CCNC: 19 U/L (ref 7–52)
AMYLASE SERPL-CCNC: 33 IU/L (ref 29–103)
ANION GAP SERPL CALCULATED.3IONS-SCNC: 8 MMOL/L (ref 4–13)
AST SERPL W P-5'-P-CCNC: 23 U/L (ref 13–39)
BASOPHILS # BLD AUTO: 0.06 THOUSANDS/ÂΜL (ref 0–0.1)
BASOPHILS NFR BLD AUTO: 1 % (ref 0–1)
BILIRUB SERPL-MCNC: 0.84 MG/DL (ref 0.2–1)
BUN SERPL-MCNC: 7 MG/DL (ref 5–25)
CALCIUM SERPL-MCNC: 10 MG/DL (ref 8.4–10.2)
CHLORIDE SERPL-SCNC: 107 MMOL/L (ref 96–108)
CO2 SERPL-SCNC: 24 MMOL/L (ref 21–32)
CREAT SERPL-MCNC: 0.81 MG/DL (ref 0.6–1.3)
EOSINOPHIL # BLD AUTO: 0.08 THOUSAND/ÂΜL (ref 0–0.61)
EOSINOPHIL NFR BLD AUTO: 1 % (ref 0–6)
ERYTHROCYTE [DISTWIDTH] IN BLOOD BY AUTOMATED COUNT: 12.3 % (ref 11.6–15.1)
GFR SERPL CREATININE-BSD FRML MDRD: 91 ML/MIN/1.73SQ M
GLUCOSE SERPL-MCNC: 88 MG/DL (ref 65–140)
HCT VFR BLD AUTO: 46.9 % (ref 34.8–46.1)
HGB BLD-MCNC: 15.5 G/DL (ref 11.5–15.4)
IMM GRANULOCYTES # BLD AUTO: 0.04 THOUSAND/UL (ref 0–0.2)
IMM GRANULOCYTES NFR BLD AUTO: 1 % (ref 0–2)
LIPASE SERPL-CCNC: 22 U/L (ref 11–82)
LYMPHOCYTES # BLD AUTO: 2.15 THOUSANDS/ÂΜL (ref 0.6–4.47)
LYMPHOCYTES NFR BLD AUTO: 25 % (ref 14–44)
MCH RBC QN AUTO: 30.1 PG (ref 26.8–34.3)
MCHC RBC AUTO-ENTMCNC: 33 G/DL (ref 31.4–37.4)
MCV RBC AUTO: 91 FL (ref 82–98)
MONOCYTES # BLD AUTO: 0.58 THOUSAND/ÂΜL (ref 0.17–1.22)
MONOCYTES NFR BLD AUTO: 7 % (ref 4–12)
NEUTROPHILS # BLD AUTO: 5.72 THOUSANDS/ÂΜL (ref 1.85–7.62)
NEUTS SEG NFR BLD AUTO: 65 % (ref 43–75)
NRBC BLD AUTO-RTO: 0 /100 WBCS
PLATELET # BLD AUTO: 292 THOUSANDS/UL (ref 149–390)
PMV BLD AUTO: 10.7 FL (ref 8.9–12.7)
POTASSIUM SERPL-SCNC: 4.2 MMOL/L (ref 3.5–5.3)
PROT SERPL-MCNC: 8 G/DL (ref 6.4–8.4)
RBC # BLD AUTO: 5.15 MILLION/UL (ref 3.81–5.12)
SODIUM SERPL-SCNC: 139 MMOL/L (ref 135–147)
WBC # BLD AUTO: 8.63 THOUSAND/UL (ref 4.31–10.16)

## 2025-08-19 PROCEDURE — 80053 COMPREHEN METABOLIC PANEL: CPT

## 2025-08-19 PROCEDURE — 85025 COMPLETE CBC W/AUTO DIFF WBC: CPT

## 2025-08-19 PROCEDURE — 36415 COLL VENOUS BLD VENIPUNCTURE: CPT

## 2025-08-19 PROCEDURE — 82150 ASSAY OF AMYLASE: CPT

## 2025-08-19 PROCEDURE — 83690 ASSAY OF LIPASE: CPT

## 2025-08-20 ENCOUNTER — HOSPITAL ENCOUNTER (OUTPATIENT)
Dept: CT IMAGING | Facility: HOSPITAL | Age: 40
Discharge: HOME | End: 2025-08-20
Attending: NURSE PRACTITIONER
Payer: COMMERCIAL

## 2025-08-20 PROCEDURE — 74160 CT ABDOMEN W/CONTRAST: CPT

## 2025-08-20 RX ADMIN — IOHEXOL 50 ML: 240 INJECTION, SOLUTION INTRATHECAL; INTRAVASCULAR; INTRAVENOUS; ORAL at 17:34

## 2025-08-20 RX ADMIN — IOHEXOL 75 ML: 350 INJECTION, SOLUTION INTRAVENOUS at 17:34
